# Patient Record
Sex: MALE | Race: WHITE | NOT HISPANIC OR LATINO | Employment: OTHER | URBAN - METROPOLITAN AREA
[De-identification: names, ages, dates, MRNs, and addresses within clinical notes are randomized per-mention and may not be internally consistent; named-entity substitution may affect disease eponyms.]

---

## 2017-08-11 RX ORDER — FLUTICASONE PROPIONATE 50 MCG
2 SPRAY, SUSPENSION (ML) NASAL DAILY
COMMUNITY

## 2017-08-11 RX ORDER — GAUZE BANDAGE 4" X 4"
BANDAGE TOPICAL
COMMUNITY

## 2017-08-11 RX ORDER — AMLODIPINE BESYLATE 5 MG/1
5 TABLET ORAL DAILY
COMMUNITY

## 2017-08-11 RX ORDER — SPIRONOLACTONE 25 MG/1
25 TABLET ORAL DAILY
COMMUNITY

## 2017-08-11 RX ORDER — FINASTERIDE 5 MG/1
5 TABLET, FILM COATED ORAL DAILY
COMMUNITY

## 2017-08-11 RX ORDER — ATENOLOL 25 MG/1
25 TABLET ORAL DAILY
COMMUNITY

## 2017-08-11 RX ORDER — SIMVASTATIN 20 MG
20 TABLET ORAL NIGHTLY
COMMUNITY

## 2017-08-11 RX ORDER — DOXAZOSIN 8 MG/1
8 TABLET ORAL NIGHTLY
COMMUNITY

## 2017-08-11 RX ORDER — BENAZEPRIL HYDROCHLORIDE 10 MG/1
10 TABLET ORAL DAILY
COMMUNITY
End: 2021-04-02

## 2017-08-14 ENCOUNTER — OFFICE VISIT (OUTPATIENT)
Dept: ORTHOPEDIC SURGERY | Facility: CLINIC | Age: 77
End: 2017-08-14

## 2017-08-14 VITALS
SYSTOLIC BLOOD PRESSURE: 115 MMHG | WEIGHT: 216 LBS | HEIGHT: 71 IN | HEART RATE: 51 BPM | DIASTOLIC BLOOD PRESSURE: 64 MMHG | BODY MASS INDEX: 30.24 KG/M2

## 2017-08-14 DIAGNOSIS — R52 PAIN: Primary | ICD-10-CM

## 2017-08-14 DIAGNOSIS — M19.079 ARTHRITIS OF FOOT: ICD-10-CM

## 2017-08-14 PROCEDURE — 20605 DRAIN/INJ JOINT/BURSA W/O US: CPT | Performed by: ORTHOPAEDIC SURGERY

## 2017-08-14 RX ORDER — OXYBUTYNIN CHLORIDE 10 MG/1
10 TABLET, EXTENDED RELEASE ORAL DAILY
COMMUNITY

## 2017-08-14 RX ORDER — METHYLPREDNISOLONE ACETATE 40 MG/ML
80 INJECTION, SUSPENSION INTRA-ARTICULAR; INTRALESIONAL; INTRAMUSCULAR; SOFT TISSUE
Status: COMPLETED | OUTPATIENT
Start: 2017-08-14 | End: 2017-08-14

## 2017-08-14 RX ORDER — BUPIVACAINE HYDROCHLORIDE 2.5 MG/ML
2 INJECTION, SOLUTION INFILTRATION; PERINEURAL
Status: COMPLETED | OUTPATIENT
Start: 2017-08-14 | End: 2017-08-14

## 2017-08-14 RX ADMIN — BUPIVACAINE HYDROCHLORIDE 2 ML: 2.5 INJECTION, SOLUTION INFILTRATION; PERINEURAL at 11:01

## 2017-08-14 RX ADMIN — METHYLPREDNISOLONE ACETATE 80 MG: 40 INJECTION, SUSPENSION INTRA-ARTICULAR; INTRALESIONAL; INTRAMUSCULAR; SOFT TISSUE at 11:01

## 2017-08-14 NOTE — PROGRESS NOTES
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     • amLODIPine (NORVASC) 5 MG tablet Take 5 mg by mouth Daily.     • atenolol (TENORMIN) 25 MG tablet Take 25 mg by mouth Daily.     • B Complex Vitamins (VITAMIN B COMPLEX PO) Take  by mouth.     • benazepril (LOTENSIN) 10 MG tablet Take 10 mg by mouth Daily.     • Calcium Carbonate-Vit D-Min (CALCIUM-VITAMIN D-MINERALS PO) Take  by mouth.     • Coenzyme Q10 (COQ10 PO) Take  by mouth.     • doxazosin (CARDURA) 8 MG tablet Take 8 mg by mouth Every Night.     • finasteride (PROSCAR) 5 MG tablet Take 5 mg by mouth Daily.     • Flaxseed, Linseed, (FLAX SEED OIL PO) Take 1,000 mg by mouth.     • fluticasone (FLONASE) 50 MCG/ACT nasal spray 2 sprays into each nostril Daily.     • Omega-3 Fatty Acids (FISH OIL) 435 MG capsule Take  by mouth.     • rivaroxaban (XARELTO) 15 MG tablet Take 15 mg by mouth Daily.     • simvastatin (ZOCOR) 20 MG tablet Take 20 mg by mouth Every Night.     • spironolactone (ALDACTONE) 25 MG tablet Take 25 mg by mouth Daily.       No current facility-administered medications on file prior to visit.       Allergies   Allergen Reactions   • Nickel Dermatitis   • Shellfish-Derived Products Rash   • Sulfa Antibiotics Rash      Past Medical History:   Diagnosis Date   • Cancer    • Chronic renal failure syndrome    • Heart disease    • History of kidney problems    • Hypertension    • Neurological disorder    • Sleep apnea    • Stroke      Past Surgical  "History:   Procedure Laterality Date   • TONSILLECTOMY     • VASECTOMY       Family History   Problem Relation Age of Onset   • Hypertension Mother    • Cancer Father    • Hypertension Other       Social History     Social History   • Marital status:      Spouse name: N/A   • Number of children: N/A   • Years of education: N/A     Occupational History   • Not on file.     Social History Main Topics   • Smoking status: Never Smoker   • Smokeless tobacco: Never Used   • Alcohol use Yes      Comment: Occ   • Drug use: No   • Sexual activity: Defer     Other Topics Concern   • Not on file     Social History Narrative        Review of Systems   Constitutional: Negative.    HENT: Negative.    Eyes: Negative.    Respiratory: Negative.    Cardiovascular: Negative.    Gastrointestinal: Negative.    Endocrine: Negative.    Genitourinary: Negative.    Musculoskeletal: Positive for arthralgias.   Skin: Negative.    Allergic/Immunologic: Negative.    Neurological: Negative.    Hematological: Negative.    Psychiatric/Behavioral: Negative.        The following portions of the patient's history were reviewed and updated as appropriate: allergies, current medications, past family history, past medical history, past social history, past surgical history and problem list.    Physical Exam:   /64  Pulse 51  Ht 71\" (180.3 cm)  Wt 216 lb (98 kg)  BMI 30.13 kg/m2  GENERAL: Body habitus: obese    Lower extremity edema: Left: 1+ pitting; Right: 1+ pitting    Gait: using cane     Mental Status:  awake and alert; oriented to person, place, and time  MSK:  No change in the tender, stiff in valgus left hindfoot, no change in the hammertoes  Medical Decision Making    Data Review:   none    Assessment/Plan/Diagnosis/Treatment Options:   Painful left hindfoot.  I injected the subtalar joint again.  We gave him some banana pads..  After discussing the risks (including infection, skin atrophy, etc), time out was called,  the left " subtalar joint was prepped and using sterile technique injected with 1cc of 0.5% marcaine and 2cc (80mg) DepoMedrol.  A band aid was applied.  No complications.

## 2017-08-14 NOTE — PROGRESS NOTES
Procedure   Small Joint Arthrocentesis  Consent given by: patient  Site marked: site marked  Timeout: Immediately prior to procedure a time out was called to verify the correct patient, procedure, equipment, support staff and site/side marked as required   Supporting Documentation  Indications: pain   Procedure Details  Location: foot - L subtalar  Needle size: 22 G  Approach: lateral  Medications administered: 2 mL bupivacaine; 80 mg methylPREDNISolone acetate 40 MG/ML  Patient tolerance: patient tolerated the procedure well with no immediate complications

## 2018-02-14 ENCOUNTER — OFFICE VISIT (OUTPATIENT)
Dept: ORTHOPEDIC SURGERY | Facility: CLINIC | Age: 78
End: 2018-02-14

## 2018-02-14 DIAGNOSIS — M19.079 ARTHRITIS OF FOOT: ICD-10-CM

## 2018-02-14 DIAGNOSIS — M19.072 PRIMARY OSTEOARTHRITIS OF LEFT ANKLE: Primary | ICD-10-CM

## 2018-02-14 PROCEDURE — 20605 DRAIN/INJ JOINT/BURSA W/O US: CPT | Performed by: ORTHOPAEDIC SURGERY

## 2018-02-14 RX ORDER — METHYLPREDNISOLONE ACETATE 40 MG/ML
80 INJECTION, SUSPENSION INTRA-ARTICULAR; INTRALESIONAL; INTRAMUSCULAR; SOFT TISSUE
Status: COMPLETED | OUTPATIENT
Start: 2018-02-14 | End: 2018-02-14

## 2018-02-14 RX ADMIN — METHYLPREDNISOLONE ACETATE 80 MG: 40 INJECTION, SUSPENSION INTRA-ARTICULAR; INTRALESIONAL; INTRAMUSCULAR; SOFT TISSUE at 11:33

## 2018-02-14 NOTE — PROGRESS NOTES
Procedure   Medium Joint Arthrocentesis  Date/Time: 2/14/2018 11:33 AM  Consent given by: patient  Site marked: site marked  Timeout: Immediately prior to procedure a time out was called to verify the correct patient, procedure, equipment, support staff and site/side marked as required   Supporting Documentation  Indications: pain   Procedure Details  Location: ankle - L ankle (subtalar )  Needle size: 22 G  Approach: anterolateral  Medications administered: 80 mg methylPREDNISolone acetate 40 MG/ML (2cc Bupivacaine .25%  NDC [id number]   [id number]  10/1/19)  Patient tolerance: patient tolerated the procedure well with no immediate complications

## 2018-02-14 NOTE — PROGRESS NOTES
ESTABLISHED PATIENT    Patient: Marcelino Elam  : 1940    Primary Care Provider: Giuliana Maxwell MD    Requesting Provider: As above    Follow-up (6 month f/u Left hindfoot arthritis)      History    Chief Complaint: Left hindfoot arthritis   History of Present Illness: He is here for his left hindfoot pain.  He still using his banana pads for the hammertoes.  He would like another injection for the left hindfoot arthritis.    Current Outpatient Prescriptions on File Prior to Visit   Medication Sig Dispense Refill   • ACETYLCARNITINE PO Take  by mouth.     • amLODIPine (NORVASC) 5 MG tablet Take 5 mg by mouth Daily.     • atenolol (TENORMIN) 25 MG tablet Take 25 mg by mouth Daily.     • B Complex Vitamins (VITAMIN B COMPLEX PO) Take  by mouth.     • benazepril (LOTENSIN) 10 MG tablet Take 10 mg by mouth Daily.     • Calcium Carbonate-Vit D-Min (CALCIUM-VITAMIN D-MINERALS PO) Take  by mouth.     • Coenzyme Q10 (COQ10 PO) Take  by mouth.     • doxazosin (CARDURA) 8 MG tablet Take 8 mg by mouth Every Night.     • finasteride (PROSCAR) 5 MG tablet Take 5 mg by mouth Daily.     • Flaxseed, Linseed, (FLAX SEED OIL PO) Take 1,000 mg by mouth.     • fluticasone (FLONASE) 50 MCG/ACT nasal spray 2 sprays into each nostril Daily.     • Omega-3 Fatty Acids (FISH OIL) 435 MG capsule Take  by mouth.     • oxybutynin XL (DITROPAN-XL) 10 MG 24 hr tablet Take 10 mg by mouth Daily.     • rivaroxaban (XARELTO) 15 MG tablet Take 15 mg by mouth Daily.     • simvastatin (ZOCOR) 20 MG tablet Take 20 mg by mouth Every Night.     • spironolactone (ALDACTONE) 25 MG tablet Take 25 mg by mouth Daily.       No current facility-administered medications on file prior to visit.       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     Social History Main Topics   • Smoking status: Never Smoker   • Smokeless tobacco: Never Used   • Alcohol use Yes      Comment: Occ   • Drug use: No   • Sexual activity: Defer     Other Topics Concern   • Not on file     Social History Narrative        Review of Systems    The following portions of the patient's history were reviewed and updated as appropriate: allergies, current medications, past family history, past medical history, past social history, past surgical history and problem list.    Physical Exam:   There were no vitals taken for this visit.  GENERAL: Body habitus: trunkal obesity    Lower extremity edema: Left: 1+ pitting; Right: 1+ pitting    Gait: using cane     Mental Status:  awake and alert; oriented to person, place, and time  MSK:  Left hindfoot is tender, stiff in valgus, no change in the hammertoes    Medical Decision Making    Data Review:   none    Assessment/Plan/Diagnosis/Treatment Options:   1. . Arthritis of foot  He still doing reasonably well with the injection every 6 months.    After discussing the risks (including infection, skin atrophy, etc), time out was called,  the left subtalar joint was prepped and using sterile  technique injected with 1cc of 0.5% marcaine and 2cc (80mg) DepoMedrol.  A band aid was applied.  No complications.

## 2018-08-15 ENCOUNTER — OFFICE VISIT (OUTPATIENT)
Dept: ORTHOPEDIC SURGERY | Facility: CLINIC | Age: 78
End: 2018-08-15

## 2018-08-15 VITALS — WEIGHT: 210.1 LBS | BODY MASS INDEX: 29.41 KG/M2 | OXYGEN SATURATION: 98 % | HEIGHT: 71 IN | HEART RATE: 103 BPM

## 2018-08-15 DIAGNOSIS — M19.079 ARTHRITIS OF FOOT: Primary | ICD-10-CM

## 2018-08-15 PROCEDURE — 20605 DRAIN/INJ JOINT/BURSA W/O US: CPT | Performed by: ORTHOPAEDIC SURGERY

## 2018-08-15 RX ORDER — BUPIVACAINE HYDROCHLORIDE 2.5 MG/ML
2 INJECTION, SOLUTION INFILTRATION; PERINEURAL
Status: COMPLETED | OUTPATIENT
Start: 2018-08-15 | End: 2018-08-15

## 2018-08-15 RX ORDER — METHYLPREDNISOLONE ACETATE 40 MG/ML
80 INJECTION, SUSPENSION INTRA-ARTICULAR; INTRALESIONAL; INTRAMUSCULAR; SOFT TISSUE
Status: COMPLETED | OUTPATIENT
Start: 2018-08-15 | End: 2018-08-15

## 2018-08-15 RX ADMIN — METHYLPREDNISOLONE ACETATE 80 MG: 40 INJECTION, SUSPENSION INTRA-ARTICULAR; INTRALESIONAL; INTRAMUSCULAR; SOFT TISSUE at 12:08

## 2018-08-15 RX ADMIN — BUPIVACAINE HYDROCHLORIDE 2 ML: 2.5 INJECTION, SOLUTION INFILTRATION; PERINEURAL at 12:08

## 2018-08-15 NOTE — PROGRESS NOTES
Procedure   Medium Joint Arthrocentesis  Date/Time: 8/15/2018 12:08 PM  Consent given by: patient  Site marked: site marked  Timeout: Immediately prior to procedure a time out was called to verify the correct patient, procedure, equipment, support staff and site/side marked as required   Supporting Documentation  Indications: pain   Procedure Details  Location: ankle (Left Subtalar joint) - L ankle  Needle size: 22 G  Approach: anterolateral  Medications administered: 2 mL bupivacaine 0.25 %; 80 mg methylPREDNISolone acetate 40 MG/ML  Patient tolerance: patient tolerated the procedure well with no immediate complications

## 2019-02-13 ENCOUNTER — OFFICE VISIT (OUTPATIENT)
Dept: ORTHOPEDIC SURGERY | Facility: CLINIC | Age: 79
End: 2019-02-13

## 2019-02-13 VITALS — HEART RATE: 77 BPM | BODY MASS INDEX: 29.41 KG/M2 | WEIGHT: 210.1 LBS | HEIGHT: 71 IN | OXYGEN SATURATION: 93 %

## 2019-02-13 DIAGNOSIS — M19.072 PRIMARY OSTEOARTHRITIS OF LEFT ANKLE: Primary | ICD-10-CM

## 2019-02-13 DIAGNOSIS — M24.575 CONTRACTURE OF JOINT OF FOOT, LEFT: ICD-10-CM

## 2019-02-13 PROCEDURE — 99212 OFFICE O/P EST SF 10 MIN: CPT | Performed by: ORTHOPAEDIC SURGERY

## 2019-02-13 PROCEDURE — 20605 DRAIN/INJ JOINT/BURSA W/O US: CPT | Performed by: ORTHOPAEDIC SURGERY

## 2019-02-13 RX ORDER — METHYLPREDNISOLONE ACETATE 40 MG/ML
80 INJECTION, SUSPENSION INTRA-ARTICULAR; INTRALESIONAL; INTRAMUSCULAR; SOFT TISSUE
Status: COMPLETED | OUTPATIENT
Start: 2019-02-13 | End: 2019-02-13

## 2019-02-13 RX ORDER — BUPIVACAINE HYDROCHLORIDE 2.5 MG/ML
2 INJECTION, SOLUTION INFILTRATION; PERINEURAL
Status: COMPLETED | OUTPATIENT
Start: 2019-02-13 | End: 2019-02-13

## 2019-02-13 RX ADMIN — METHYLPREDNISOLONE ACETATE 80 MG: 40 INJECTION, SUSPENSION INTRA-ARTICULAR; INTRALESIONAL; INTRAMUSCULAR; SOFT TISSUE at 11:14

## 2019-02-13 RX ADMIN — BUPIVACAINE HYDROCHLORIDE 2 ML: 2.5 INJECTION, SOLUTION INFILTRATION; PERINEURAL at 11:14

## 2019-02-13 NOTE — PROGRESS NOTES
Procedure   Small Joint Arthrocentesis: L subtalar  Consent given by: patient  Site marked: site marked  Timeout: Immediately prior to procedure a time out was called to verify the correct patient, procedure, equipment, support staff and site/side marked as required   Supporting Documentation  Indications: pain   Procedure Details  Location: foot - L subtalar  Preparation: Patient was prepped and draped in the usual sterile fashion  Needle size: 22 G  Approach: lateral  Medications administered: 2 mL bupivacaine 0.25 %; 80 mg methylPREDNISolone acetate 40 MG/ML  Patient tolerance: patient tolerated the procedure well with no immediate complications

## 2019-02-13 NOTE — PROGRESS NOTES
ESTABLISHED PATIENT    Patient: Marcelino Elam  : 1940    Primary Care Provider: Giuliana Maxwell MD    Requesting Provider: As above    Follow-up (6 month- Primary osteoarthritis of left ankle and foot)      History    Chief Complaint: left hindfoot pain, hammertoe    History of Present Illness: he returns for f/u.  No recent travels.  He is using his walking stick all the time now.  Still using banana pad for hammertoe.    Current Outpatient Medications on File Prior to Visit   Medication Sig Dispense Refill   • ACETYLCARNITINE PO Take  by mouth.     • amLODIPine (NORVASC) 5 MG tablet Take 5 mg by mouth Daily.     • atenolol (TENORMIN) 25 MG tablet Take 25 mg by mouth Daily.     • B Complex Vitamins (VITAMIN B COMPLEX PO) Take  by mouth.     • benazepril (LOTENSIN) 10 MG tablet Take 10 mg by mouth Daily.     • Calcium Carbonate-Vit D-Min (CALCIUM-VITAMIN D-MINERALS PO) Take  by mouth.     • Coenzyme Q10 (COQ10 PO) Take  by mouth.     • doxazosin (CARDURA) 8 MG tablet Take 8 mg by mouth Every Night.     • finasteride (PROSCAR) 5 MG tablet Take 5 mg by mouth Daily.     • Flaxseed, Linseed, (FLAX SEED OIL PO) Take 1,000 mg by mouth.     • fluticasone (FLONASE) 50 MCG/ACT nasal spray 2 sprays into each nostril Daily.     • Nitrofurantoin Macrocrystal (MACRODANTIN PO) Take  by mouth Daily.     • Omega-3 Fatty Acids (FISH OIL) 435 MG capsule Take  by mouth.     • oxybutynin XL (DITROPAN-XL) 10 MG 24 hr tablet Take 10 mg by mouth Daily.     • rivaroxaban (XARELTO) 15 MG tablet Take 15 mg by mouth Daily.     • simvastatin (ZOCOR) 20 MG tablet Take 20 mg by mouth Every Night.     • spironolactone (ALDACTONE) 25 MG tablet Take 25 mg by mouth Daily.       No current facility-administered medications on file prior to visit.       Allergies   Allergen Reactions   • Nickel Dermatitis   • Shellfish-Derived Products Rash   • Sulfa Antibiotics Rash      Past Medical History:   Diagnosis Date   • Cancer (CMS/HCC)     • Chronic renal failure syndrome    • Eye vision summary    • Heart disease    • History of kidney problems    • Hypertension    • Kidney problems    • Kidney stone    • Neurological disorder    • Neuropathy    • Osteoarthrosis, ankle and foot     hindfoot arthritits of the left, due to old posterior tibial dysfuntion   • Radiotherapy    • Sleep apnea    • Stroke (CMS/HCC)      Past Surgical History:   Procedure Laterality Date   • OTHER SURGICAL HISTORY     • TONSILLECTOMY     • VASECTOMY       Family History   Problem Relation Age of Onset   • Hypertension Mother    • Heart disease Mother    • Cancer Father    • Hypertension Other    • Heart disease Other       Social History     Socioeconomic History   • Marital status:      Spouse name: Not on file   • Number of children: Not on file   • Years of education: Not on file   • Highest education level: Not on file   Social Needs   • Financial resource strain: Not on file   • Food insecurity - worry: Not on file   • Food insecurity - inability: Not on file   • Transportation needs - medical: Not on file   • Transportation needs - non-medical: Not on file   Occupational History   • Not on file   Tobacco Use   • Smoking status: Never Smoker   • Smokeless tobacco: Never Used   Substance and Sexual Activity   • Alcohol use: Yes     Comment: Occ   • Drug use: No   • Sexual activity: Defer   Other Topics Concern   • Not on file   Social History Narrative   • Not on file        Review of Systems   Constitutional: Negative.    HENT: Negative.    Eyes: Negative.    Respiratory: Negative.    Cardiovascular: Negative.    Gastrointestinal: Negative.    Endocrine: Negative.    Genitourinary: Negative.    Musculoskeletal: Positive for arthralgias.   Skin: Negative.    Allergic/Immunologic: Negative.    Neurological: Negative.    Hematological: Negative.    Psychiatric/Behavioral: Negative.        The following portions of the patient's history were reviewed and updated as  "appropriate: allergies, current medications, past family history, past medical history, past social history, past surgical history and problem list.    Physical Exam:   Pulse 77   Ht 180.3 cm (70.98\")   Wt 95.3 kg (210 lb 1.6 oz)   SpO2 93%   BMI 29.32 kg/m²   GENERAL: Body habitus: trunkal obesity    Lower extremity edema: Left: none; Right: none    Gait: using walking stick     Mental Status:  awake and alert; oriented to person, place, and time  MSK:           Foot: Left:  hammertoes not tender, no pre-ulcerous lesions, no change in stiff valgus hindfoot, tender over subtalar joint        Medical Decision Making    Data Review:   ordered and reviewed x-rays today    Assessment/Plan/Diagnosis/Treatment Options:   1. Primary osteoarthritis of left ankle  No xray changes - hindfoot valgus and subtalar arthritis.  He would like another injection.  He is doing reasonably well with these.      After discussing the risks (including infection, skin atrophy, etc), time out was called,  the left subtalar joint was prepped and using sterile technique injected with 2cc of 0.5% marcaine and 2cc (80mg) DepoMedrol.  A band aid was applied.  No complications.       I will see him in 6 months, no xray needed at that time        2. Contracture of joint of foot, left  Still reasonably comfortable with banana pads                            "

## 2019-08-19 ENCOUNTER — OFFICE VISIT (OUTPATIENT)
Dept: ORTHOPEDIC SURGERY | Facility: CLINIC | Age: 79
End: 2019-08-19

## 2019-08-19 VITALS
BODY MASS INDEX: 29.4 KG/M2 | RESPIRATION RATE: 16 BRPM | HEIGHT: 71 IN | OXYGEN SATURATION: 96 % | WEIGHT: 210 LBS | HEART RATE: 95 BPM

## 2019-08-19 DIAGNOSIS — L98.491 NEUROPATHIC ULCER, LIMITED TO BREAKDOWN OF SKIN (HCC): ICD-10-CM

## 2019-08-19 DIAGNOSIS — M19.072 PRIMARY OSTEOARTHRITIS OF LEFT ANKLE: Primary | ICD-10-CM

## 2019-08-19 PROCEDURE — 20605 DRAIN/INJ JOINT/BURSA W/O US: CPT | Performed by: ORTHOPAEDIC SURGERY

## 2019-08-19 PROCEDURE — 99212 OFFICE O/P EST SF 10 MIN: CPT | Performed by: ORTHOPAEDIC SURGERY

## 2019-08-19 RX ORDER — LIDOCAINE HYDROCHLORIDE 10 MG/ML
2 INJECTION, SOLUTION EPIDURAL; INFILTRATION; INTRACAUDAL; PERINEURAL
Status: COMPLETED | OUTPATIENT
Start: 2019-08-19 | End: 2019-08-19

## 2019-08-19 RX ORDER — METHYLPREDNISOLONE ACETATE 40 MG/ML
80 INJECTION, SUSPENSION INTRA-ARTICULAR; INTRALESIONAL; INTRAMUSCULAR; SOFT TISSUE
Status: COMPLETED | OUTPATIENT
Start: 2019-08-19 | End: 2019-08-19

## 2019-08-19 RX ORDER — CEPHALEXIN 500 MG/1
500 CAPSULE ORAL EVERY 6 HOURS
Qty: 60 CAPSULE | Refills: 0 | Status: SHIPPED | OUTPATIENT
Start: 2019-08-19 | End: 2021-11-08

## 2019-08-19 RX ADMIN — LIDOCAINE HYDROCHLORIDE 2 ML: 10 INJECTION, SOLUTION EPIDURAL; INFILTRATION; INTRACAUDAL; PERINEURAL at 11:15

## 2019-08-19 RX ADMIN — METHYLPREDNISOLONE ACETATE 80 MG: 40 INJECTION, SUSPENSION INTRA-ARTICULAR; INTRALESIONAL; INTRAMUSCULAR; SOFT TISSUE at 11:15

## 2019-08-19 NOTE — PROGRESS NOTES
Procedure   Medium Joint Arthrocentesis  Date/Time: 8/19/2019 11:15 AM  Consent given by: patient  Site marked: site marked  Timeout: Immediately prior to procedure a time out was called to verify the correct patient, procedure, equipment, support staff and site/side marked as required   Supporting Documentation  Indications: pain   Procedure Details  Location: subtalar.  Preparation: Patient was prepped and draped in the usual sterile fashion  Needle size: 22 G  Approach: lateral.  Medications administered: 2 mL lidocaine PF 1% 1 %; 80 mg methylPREDNISolone acetate 40 MG/ML

## 2019-08-19 NOTE — PROGRESS NOTES
ESTABLISHED PATIENT    Patient: Marcelino Elam  : 1940    Primary Care Provider: Giuliana Maxwell MD    Requesting Provider: As above    Follow-up of the Left Foot and Pain of the Right Foot      History    Chief Complaint: left foot pain, right foot neuropathic ulcer    History of Present Illness: He returns for follow-up of his left hindfoot arthritis.  He was able to go to an island in the [place], he did a lot of walking.  He now has a callus and an ulcer on the tip of the right great toe.  Over the past week he has noted some redness around the distal phalanx.  He would like to follow-up for this with his podiatrist, the podiatrist has been treating the toe, but he was wondering if he needed an antibiotic.    Current Outpatient Medications on File Prior to Visit   Medication Sig Dispense Refill   • ACETYLCARNITINE PO Take  by mouth.     • amLODIPine (NORVASC) 5 MG tablet Take 5 mg by mouth Daily.     • atenolol (TENORMIN) 25 MG tablet Take 25 mg by mouth Daily.     • B Complex Vitamins (VITAMIN B COMPLEX PO) Take  by mouth.     • benazepril (LOTENSIN) 10 MG tablet Take 10 mg by mouth Daily.     • Calcium Carbonate-Vit D-Min (CALCIUM-VITAMIN D-MINERALS PO) Take  by mouth.     • Coenzyme Q10 (COQ10 PO) Take  by mouth.     • doxazosin (CARDURA) 8 MG tablet Take 8 mg by mouth Every Night.     • finasteride (PROSCAR) 5 MG tablet Take 5 mg by mouth Daily.     • Flaxseed, Linseed, (FLAX SEED OIL PO) Take 1,000 mg by mouth.     • fluticasone (FLONASE) 50 MCG/ACT nasal spray 2 sprays into each nostril Daily.     • Nitrofurantoin Macrocrystal (MACRODANTIN PO) Take  by mouth Daily.     • Omega-3 Fatty Acids (FISH OIL) 435 MG capsule Take  by mouth.     • oxybutynin XL (DITROPAN-XL) 10 MG 24 hr tablet Take 10 mg by mouth Daily.     • rivaroxaban (XARELTO) 15 MG tablet Take 15 mg by mouth Daily.     • simvastatin (ZOCOR) 20 MG tablet Take 20 mg by mouth Every Night.     • spironolactone (ALDACTONE) 25 MG  tablet Take 25 mg by mouth Daily.       No current facility-administered medications on file prior to visit.       Allergies   Allergen Reactions   • Nickel Dermatitis   • Shellfish-Derived Products Rash   • Sulfa Antibiotics Rash      Past Medical History:   Diagnosis Date   • Cancer (CMS/HCC)    • Chronic renal failure syndrome    • Eye vision summary    • Heart disease    • History of kidney problems    • Hypertension    • Kidney problems    • Kidney stone    • Neurological disorder    • Neuropathy    • Osteoarthrosis, ankle and foot     hindfoot arthritits of the left, due to old posterior tibial dysfuntion   • Radiotherapy    • Sleep apnea    • Stroke (CMS/HCC)      Past Surgical History:   Procedure Laterality Date   • OTHER SURGICAL HISTORY     • TONSILLECTOMY     • VASECTOMY       Family History   Problem Relation Age of Onset   • Hypertension Mother    • Heart disease Mother    • Cancer Father    • Hypertension Other    • Heart disease Other       Social History     Socioeconomic History   • Marital status:      Spouse name: Not on file   • Number of children: Not on file   • Years of education: Not on file   • Highest education level: Not on file   Tobacco Use   • Smoking status: Never Smoker   • Smokeless tobacco: Never Used   Substance and Sexual Activity   • Alcohol use: Yes     Comment: Occ   • Drug use: No   • Sexual activity: Defer        Review of Systems   Constitutional: Negative.    HENT: Negative.    Eyes: Negative.    Respiratory: Negative.    Cardiovascular: Negative.    Gastrointestinal: Negative.    Endocrine: Negative.    Genitourinary: Negative.    Musculoskeletal: Positive for arthralgias and joint swelling.   Skin: Negative.    Allergic/Immunologic: Negative.    Neurological: Negative.    Hematological: Negative.    Psychiatric/Behavioral: Negative.        The following portions of the patient's history were reviewed and updated as appropriate: allergies, current medications, past  "family history, past medical history, past social history, past surgical history and problem list.    Physical Exam:   Pulse 95   Resp 16   Ht 180.3 cm (70.99\")   Wt 95.3 kg (210 lb)   SpO2 96%   BMI 29.30 kg/m²   GENERAL: Body habitus: overweight    Lower extremity edema: Left: trace; Right: trace    Gait: Using walking stick     Mental Status:  awake and alert; oriented to person, place, and time  MSK:  Tibia:  Right:  non tender; Left:  non tender        Ankle:  Right: non tender; Left:  non tender        Foot:  Right:  Callus on tip of great toe with underlying small ulcer, mild erythema around the dorsal distal phalanx, no drainage, no proximal erythema; Left:  Tender over the subtalar joint    NEURO Sensation:  diminished    Medical Decision Making    Data Review:   none    Assessment/Plan/Diagnosis/Treatment Options:   1. Primary osteoarthritis of left ankle  He still has reasonable benefit from intermittent subtalar injection.  I will see him in 6 months standing 2 views of both feet and AP both ankles    After discussing the risks (including infection, skin atrophy, etc), time out was called,  the left subtalar joint was prepped and using sterile technique injected with 2cc of 1% lidocaine and 2cc (80mg) DepoMedrol.  A band aid was applied.  No complications.             2. Neuropathic ulcer, limited to breakdown of skin (CMS/HCC)  The ulcer on the right great toe is concerning because of the local erythema.  He wants to follow-up with his podiatrist and I think that is fine.  But we will go ahead and start Keflex today.  I sent it to his pharmacy.  I will be happy to see him for the ulcer if it does not improve.  Otherwise I will see him in 6 months as noted above                            "

## 2020-02-12 ENCOUNTER — OFFICE VISIT (OUTPATIENT)
Dept: ORTHOPEDIC SURGERY | Facility: CLINIC | Age: 80
End: 2020-02-12

## 2020-02-12 VITALS — BODY MASS INDEX: 28.7 KG/M2 | HEIGHT: 71 IN | WEIGHT: 205 LBS | OXYGEN SATURATION: 92 % | HEART RATE: 57 BPM

## 2020-02-12 DIAGNOSIS — M19.079 ARTHRITIS OF FOOT: Primary | ICD-10-CM

## 2020-02-12 PROCEDURE — 99212 OFFICE O/P EST SF 10 MIN: CPT | Performed by: ORTHOPAEDIC SURGERY

## 2020-02-12 PROCEDURE — 20605 DRAIN/INJ JOINT/BURSA W/O US: CPT | Performed by: ORTHOPAEDIC SURGERY

## 2020-02-12 RX ORDER — METHYLPREDNISOLONE ACETATE 40 MG/ML
80 INJECTION, SUSPENSION INTRA-ARTICULAR; INTRALESIONAL; INTRAMUSCULAR; SOFT TISSUE
Status: COMPLETED | OUTPATIENT
Start: 2020-02-12 | End: 2020-02-12

## 2020-02-12 RX ORDER — LIDOCAINE HYDROCHLORIDE 10 MG/ML
2 INJECTION, SOLUTION EPIDURAL; INFILTRATION; INTRACAUDAL; PERINEURAL
Status: COMPLETED | OUTPATIENT
Start: 2020-02-12 | End: 2020-02-12

## 2020-02-12 RX ADMIN — METHYLPREDNISOLONE ACETATE 80 MG: 40 INJECTION, SUSPENSION INTRA-ARTICULAR; INTRALESIONAL; INTRAMUSCULAR; SOFT TISSUE at 10:45

## 2020-02-12 RX ADMIN — LIDOCAINE HYDROCHLORIDE 2 ML: 10 INJECTION, SOLUTION EPIDURAL; INFILTRATION; INTRACAUDAL; PERINEURAL at 10:45

## 2020-02-12 NOTE — PROGRESS NOTES
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  Dense neuropathy.  Since last visit he has been diagnosed with multiple myeloma, is going to have treatment at [facility].      Current Outpatient Medications on File Prior to Visit   Medication Sig Dispense Refill   • ACETYLCARNITINE PO Take  by mouth.     • amLODIPine (NORVASC) 5 MG tablet Take 5 mg by mouth Daily.     • atenolol (TENORMIN) 25 MG tablet Take 25 mg by mouth Daily.     • B Complex Vitamins (VITAMIN B COMPLEX PO) Take  by mouth.     • benazepril (LOTENSIN) 10 MG tablet Take 10 mg by mouth Daily.     • Calcium Carbonate-Vit D-Min (CALCIUM-VITAMIN D-MINERALS PO) Take  by mouth.     • cephalexin (KEFLEX) 500 MG capsule Take 1 capsule by mouth Every 6 (Six) Hours. 60 capsule 0   • Coenzyme Q10 (COQ10 PO) Take  by mouth.     • doxazosin (CARDURA) 8 MG tablet Take 8 mg by mouth Every Night.     • finasteride (PROSCAR) 5 MG tablet Take 5 mg by mouth Daily.     • Flaxseed, Linseed, (FLAX SEED OIL PO) Take 1,000 mg by mouth.     • fluticasone (FLONASE) 50 MCG/ACT nasal spray 2 sprays into each nostril Daily.     • Nitrofurantoin Macrocrystal (MACRODANTIN PO) Take  by mouth Daily.     • Omega-3 Fatty Acids (FISH OIL) 435 MG capsule Take  by mouth.     • oxybutynin XL (DITROPAN-XL) 10 MG 24 hr tablet Take 10 mg by mouth Daily.     • rivaroxaban (XARELTO) 15 MG tablet Take 15 mg by mouth Daily.     • simvastatin (ZOCOR) 20 MG tablet Take 20 mg by mouth Every Night.     • spironolactone (ALDACTONE) 25 MG tablet Take 25 mg by mouth Daily.       No current facility-administered medications on file prior to visit.       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   • Radiotherapy    • Sleep apnea    • Stroke (CMS/HCC)      Past Surgical History:   Procedure Laterality Date   • OTHER SURGICAL HISTORY     • TONSILLECTOMY     • VASECTOMY       Family History   Problem Relation Age of Onset   • Hypertension Mother    • Heart disease Mother    • Cancer Father    • Hypertension Other    • Heart disease Other       Social History     Socioeconomic History   • Marital status:      Spouse name: Not on file   • Number of children: Not on file   • Years of education: Not on file   • Highest education level: Not on file   Tobacco Use   • Smoking status: Never Smoker   • Smokeless tobacco: Never Used   Substance and Sexual Activity   • Alcohol use: Yes     Comment: Occ   • Drug use: No   • Sexual activity: Defer        Review of Systems   Musculoskeletal: Positive for arthralgias and joint swelling.       The following portions of the patient's history were reviewed and updated as appropriate: allergies, current medications, past family history, past medical history, past social history, past surgical history and problem list.    Physical Exam:   There were no vitals taken for this visit.  GENERAL: Body habitus: overweight    Lower extremity edema: Left: trace; Right: trace    Gait: antalgic and using walking stick     Mental Status:  awake and alert; oriented to person, place, and time  MSK:  Tibia:  Right:  non tender; Left:  non tender        Ankle:  Right: non tender; Left:  tender in sinus  tarsi, no change in hindfoot valgus        Foot:  Right:  no ulcer on great toe; Left:  tender in hindfoot    NEURO Sensation:  diminished    Medical Decision Making    Data Review:   ordered and reviewed x-rays today    Assessment/Plan/Diagnosis/Treatment Options:   1. Arthritis of foot  X-rays are stable, no tilt of talus on either side, no lytic lesions, no change.        After discussing the risks (including infection, skin atrophy, etc), time out was called,  the left subtalar joint was prepped and using sterile technique injected with 2cc of 1% lidocaine and 2cc (80mg) DepoMedrol.  A band aid was applied.  No complications.         I will see him in 6 months

## 2020-02-12 NOTE — PROGRESS NOTES
Procedure   Medium Joint Arthrocentesis: L ankle  Date/Time: 2/12/2020 10:45 AM  Consent given by: patient  Site marked: site marked  Timeout: Immediately prior to procedure a time out was called to verify the correct patient, procedure, equipment, support staff and site/side marked as required   Supporting Documentation  Indications: pain   Procedure Details  Location: ankle - L ankle (subtalar joint)  Preparation: Patient was prepped and draped in the usual sterile fashion  Needle size: 22 G  Approach: anterolateral  Medications administered: 80 mg methylPREDNISolone acetate 40 MG/ML; 2 mL lidocaine PF 1% 1 %  Patient tolerance: patient tolerated the procedure well with no immediate complications

## 2020-08-12 ENCOUNTER — OFFICE VISIT (OUTPATIENT)
Dept: ORTHOPEDIC SURGERY | Facility: CLINIC | Age: 80
End: 2020-08-12

## 2020-08-12 VITALS — BODY MASS INDEX: 26.07 KG/M2 | WEIGHT: 186.2 LBS | HEIGHT: 71 IN | HEART RATE: 62 BPM

## 2020-08-12 DIAGNOSIS — M19.079 ARTHRITIS OF FOOT: ICD-10-CM

## 2020-08-12 DIAGNOSIS — G89.29 CHRONIC PAIN OF LEFT ANKLE: Primary | ICD-10-CM

## 2020-08-12 DIAGNOSIS — M25.572 CHRONIC PAIN OF LEFT ANKLE: Primary | ICD-10-CM

## 2020-08-12 PROCEDURE — 20605 DRAIN/INJ JOINT/BURSA W/O US: CPT | Performed by: ORTHOPAEDIC SURGERY

## 2020-08-12 RX ORDER — CHLORAL HYDRATE 500 MG
CAPSULE ORAL
COMMUNITY

## 2020-08-12 RX ORDER — CHOLECALCIFEROL (VITAMIN D3) 1250 MCG
CAPSULE ORAL
COMMUNITY

## 2020-08-12 RX ORDER — KETOCONAZOLE 20 MG/ML
SHAMPOO TOPICAL
COMMUNITY
Start: 2014-12-16

## 2020-08-12 RX ORDER — LENALIDOMIDE 5 MG/1
5 CAPSULE ORAL ONCE
COMMUNITY

## 2020-08-12 RX ORDER — SILDENAFIL 100 MG/1
TABLET, FILM COATED ORAL
COMMUNITY

## 2020-08-12 RX ORDER — BENAZEPRIL HYDROCHLORIDE 10 MG/1
TABLET ORAL
COMMUNITY

## 2020-08-12 RX ORDER — TADALAFIL 5 MG/1
TABLET ORAL
COMMUNITY

## 2020-08-12 RX ORDER — METHYLPREDNISOLONE ACETATE 40 MG/ML
80 INJECTION, SUSPENSION INTRA-ARTICULAR; INTRALESIONAL; INTRAMUSCULAR; SOFT TISSUE
Status: COMPLETED | OUTPATIENT
Start: 2020-08-12 | End: 2020-08-12

## 2020-08-12 RX ORDER — LIDOCAINE HYDROCHLORIDE 10 MG/ML
2 INJECTION, SOLUTION EPIDURAL; INFILTRATION; INTRACAUDAL; PERINEURAL
Status: COMPLETED | OUTPATIENT
Start: 2020-08-12 | End: 2020-08-12

## 2020-08-12 RX ADMIN — METHYLPREDNISOLONE ACETATE 80 MG: 40 INJECTION, SUSPENSION INTRA-ARTICULAR; INTRALESIONAL; INTRAMUSCULAR; SOFT TISSUE at 11:04

## 2020-08-12 RX ADMIN — LIDOCAINE HYDROCHLORIDE 2 ML: 10 INJECTION, SOLUTION EPIDURAL; INFILTRATION; INTRACAUDAL; PERINEURAL at 11:04

## 2020-08-12 NOTE — PROGRESS NOTES
ESTABLISHED PATIENT    Patient: Marcelino Elam  : 1940    Primary Care Provider: Giuliana Maxwell MD    Requesting Provider: As above    Follow-up (6 month follow up Arthritis of foot)      History    Chief Complaint: Left hindfoot pain    History of Present Illness: He returns for follow-up of his severe left hindfoot arthritis.  He reports his treatment for multiple myeloma is going well.    Current Outpatient Medications on File Prior to Visit   Medication Sig Dispense Refill   • ACETYLCARNITINE PO Take  by mouth.     • amLODIPine (NORVASC) 5 MG tablet Take 5 mg by mouth Daily.     • atenolol (TENORMIN) 25 MG tablet Take 25 mg by mouth Daily.     • B Complex Vitamins (VITAMIN B COMPLEX PO) Take  by mouth.     • B Complex Vitamins (VITAMIN B-COMPLEX 100 IJ) Vitamin B-Complex   Daily     • benazepril (LOTENSIN) 10 MG tablet Take 10 mg by mouth Daily.     • benazepril (LOTENSIN) 10 MG tablet benazepril 10 mg tablet   TAKE ONE TABLET BY MOUTH DAILY     • Calcium Carbonate-Vit D-Min (CALCIUM-VITAMIN D-MINERALS PO) Take  by mouth.     • cephalexin (KEFLEX) 500 MG capsule Take 1 capsule by mouth Every 6 (Six) Hours. 60 capsule 0   • Cholecalciferol (VITAMIN D3) 1.25 MG (29992 UT) capsule Vitamin D3   Daily     • Coenzyme Q10 (COQ10 PO) Take  by mouth.     • Coenzyme Q10 10 MG capsule coenzyme Q10 100 mg capsule   Daily     • doxazosin (CARDURA) 8 MG tablet Take 8 mg by mouth Every Night.     • finasteride (PROSCAR) 5 MG tablet Take 5 mg by mouth Daily.     • Flaxseed, Linseed, (FLAX SEED OIL PO) Take 1,000 mg by mouth.     • fluticasone (FLONASE) 50 MCG/ACT nasal spray 2 sprays into each nostril Daily.     • ketoconazole (NIZORAL) 2 % shampoo ketoconazole 2 % shampoo   Daily     • lenalidomide (Revlimid) 5 MG capsule Take 5 mg by mouth 1 (One) Time.     • LEVOFLOXACIN IV Infuse  into a venous catheter.     • Nitrofurantoin Macrocrystal (MACRODANTIN PO) Take  by mouth Daily.     • Omega-3 Fatty Acids  (FISH OIL) 1000 MG capsule capsule Fish Oil 300 mg-1,000 mg capsule,delayed release   Daily     • Omega-3 Fatty Acids (FISH OIL) 435 MG capsule Take  by mouth.     • oxybutynin XL (DITROPAN-XL) 10 MG 24 hr tablet Take 10 mg by mouth Daily.     • rivaroxaban (XARELTO) 15 MG tablet Take 15 mg by mouth Daily.     • sildenafil (VIAGRA) 100 MG tablet sildenafil 100 mg tablet   TAKE ONE TABLET BY MOUTH DAILY     • simvastatin (ZOCOR) 20 MG tablet Take 20 mg by mouth Every Night.     • spironolactone (ALDACTONE) 25 MG tablet Take 25 mg by mouth Daily.     • tadalafil (CIALIS) 5 MG tablet tadalafil 5 mg tablet   Take 1 tablet daily PRN       No current facility-administered medications on file prior to visit.       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   • Radiotherapy    • Sleep apnea    • Stroke (CMS/HCC)      Past Surgical History:   Procedure Laterality Date   • OTHER SURGICAL HISTORY     • TONSILLECTOMY     • VASECTOMY       Family History   Problem Relation Age of Onset   • Hypertension Mother    • Heart disease Mother    • Cancer Father    • Hypertension Other    • Heart disease Other       Social History     Socioeconomic History   • Marital status:      Spouse name: Not on file   • Number of children: Not on file   • Years of education: Not on file   • Highest education level: Not on file   Tobacco Use   • Smoking status: Never Smoker   • Smokeless tobacco: Never Used   Substance and Sexual Activity   • Alcohol use: Yes  "    Comment: Occ   • Drug use: No   • Sexual activity: Defer        Review of Systems   Constitutional: Negative.    HENT: Negative.    Eyes: Negative.    Respiratory: Negative.    Cardiovascular: Negative.    Gastrointestinal: Negative.    Endocrine: Negative.    Genitourinary: Negative.    Musculoskeletal: Positive for arthralgias.   Skin: Negative.    Allergic/Immunologic: Negative.    Neurological: Negative.    Hematological: Negative.    Psychiatric/Behavioral: Negative.        The following portions of the patient's history were reviewed and updated as appropriate: allergies, current medications, past family history, past medical history, past social history, past surgical history and problem list.    Physical Exam:   Pulse 62   Ht 180.3 cm (70.98\")   Wt 84.5 kg (186 lb 3.2 oz)   BMI 25.98 kg/m²   GENERAL: Body habitus: overweight    Lower extremity edema: Left: trace; Right: trace    Gait: Using walking sticks     Mental Status:  awake and alert; oriented to person, place, and time  MSK:  Tibia:  Right:  non tender; Left:  non tender        Ankle:  Right: non tender; Left:  Tender in the subtalar joint in the sinus Tarsi, very stiff as previously    Of note he has a callus on the tip of the right great toe, his podiatrist is taking care of this, no signs of infection        NEURO Sensation:  diminished    Medical Decision Making    Data Review:   none    Assessment/Plan/Diagnosis/Treatment Options:   1. Chronic pain of left ankle  He is here for injection, in the left subtalar joint.  His podiatrist is taking care of the callus on the right great toe.  I will see him in 6 months or sooner with any problems.      After discussing the risks (including infection, skin atrophy, etc), time out was called,  the left subtalar joint was prepped and using sterile technique injected with 2cc of 1% lidocaine and 2cc (80mg) DepoMedrol.  A band aid was applied.  No complications.           - Medium Joint " Arthrocentesis: L ankle

## 2020-08-12 NOTE — PROGRESS NOTES
Procedure   Medium Joint Arthrocentesis: L ankle  Date/Time: 8/12/2020 11:04 AM  Consent given by: patient  Site marked: site marked  Timeout: Immediately prior to procedure a time out was called to verify the correct patient, procedure, equipment, support staff and site/side marked as required   Supporting Documentation  Indications: pain   Procedure Details  Location: ankle - L ankle  Preparation: Patient was prepped and draped in the usual sterile fashion  Needle size: 22 G  Approach: anteromedial  Medications administered: 80 mg methylPREDNISolone acetate 40 MG/ML; 2 mL lidocaine PF 1% 1 %  Patient tolerance: patient tolerated the procedure well with no immediate complications

## 2021-01-20 ENCOUNTER — TELEPHONE (OUTPATIENT)
Dept: ORTHOPEDIC SURGERY | Facility: CLINIC | Age: 81
End: 2021-01-20

## 2021-01-20 NOTE — TELEPHONE ENCOUNTER
Called patient to get their appointment on 2/17/2021 rescheduled. Dr. Lopez will be out of the office from 2/16/2021-3/16/2021.

## 2021-03-18 NOTE — PROGRESS NOTES
----- Message from Ethelyn Kanner, MA sent at 3/18/2021 11:11 AM EDT -----  Regarding: [facility]  03/18/21 11:12 AM    Hello, our patient Raymond Ortiz has had CRC: Colonoscopy, Hemoglobin A1c, Hepatitis C, and Urine Microalbumin completed/performed  Please assist in updating the patient chart by pulling the Care Everywhere (CE) document  The date of service is colonoscopy [id number], HepC [id number], A1C [id number], Microalbumin urine [id number]       Thank you,  Ethelyn Kanner, MA  [facility] ESTABLISHED PATIENT    Patient: Marcelino Elam  : 1940    Primary Care Provider: Giuliana Maxwell MD    Requesting Provider: As above    Follow-up (6 month - Primary osteoarthritis of left ankle)      History    Chief Complaint: left hindfoot pain    History of Present Illness: he is here for left hindfoot pain, would like another injection.  Using banana pad for hammertoe    Current Outpatient Prescriptions on File Prior to Visit   Medication Sig Dispense Refill   • ACETYLCARNITINE PO Take  by mouth.     • amLODIPine (NORVASC) 5 MG tablet Take 5 mg by mouth Daily.     • atenolol (TENORMIN) 25 MG tablet Take 25 mg by mouth Daily.     • B Complex Vitamins (VITAMIN B COMPLEX PO) Take  by mouth.     • benazepril (LOTENSIN) 10 MG tablet Take 10 mg by mouth Daily.     • Calcium Carbonate-Vit D-Min (CALCIUM-VITAMIN D-MINERALS PO) Take  by mouth.     • Coenzyme Q10 (COQ10 PO) Take  by mouth.     • doxazosin (CARDURA) 8 MG tablet Take 8 mg by mouth Every Night.     • finasteride (PROSCAR) 5 MG tablet Take 5 mg by mouth Daily.     • Flaxseed, Linseed, (FLAX SEED OIL PO) Take 1,000 mg by mouth.     • fluticasone (FLONASE) 50 MCG/ACT nasal spray 2 sprays into each nostril Daily.     • Omega-3 Fatty Acids (FISH OIL) 435 MG capsule Take  by mouth.     • oxybutynin XL (DITROPAN-XL) 10 MG 24 hr tablet Take 10 mg by mouth Daily.     • rivaroxaban (XARELTO) 15 MG tablet Take 15 mg by mouth Daily.     • simvastatin (ZOCOR) 20 MG tablet Take 20 mg by mouth Every Night.     • spironolactone (ALDACTONE) 25 MG tablet Take 25 mg by mouth Daily.       No current facility-administered medications on file prior to visit.       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     Social History Main Topics   • Smoking status: Never Smoker   • Smokeless tobacco: Never Used   • Alcohol use Yes      Comment: Occ   • Drug use: No   • Sexual activity: Defer     Other Topics Concern   • Not on file     Social History Narrative   • No narrative on file        Review of Systems   Constitutional: Negative.    HENT: Negative.    Eyes: Negative.    Respiratory: Negative.    Cardiovascular: Negative.    Gastrointestinal: Negative.    Endocrine: Negative.    Genitourinary: Negative.    Musculoskeletal: Positive for arthralgias, gait problem and joint swelling.   Skin: Negative.    Allergic/Immunologic: Negative.    Hematological: Negative.    Psychiatric/Behavioral: Negative.        The following portions of the patient's history were reviewed and updated as appropriate: allergies, current medications, past family history, past medical history, past social history, past surgical history and problem list.    Physical Exam:   Pulse 103   Ht 180.3 cm (70.98\")   Wt 95.3 kg (210 lb 1.6 oz)   SpO2 98%   BMI 29.32 kg/m²   GENERAL: Body habitus: overweight    Lower extremity edema: Left: trace; Right: trace    Gait: antalgic and using cane     Mental Status:  awake and alert; " oriented to person, place, and time  MSK:  Tender left subtalar joint    Medical Decision Making    Data Review:   none    Assessment/Plan/Diagnosis/Treatment Options:   1. Arthritis of foot  Still doing reasonably well with left supramalleolar brace and intermittent injection.      After discussing the risks (including infection, skin atrophy, etc), time out was called,  the left subtalar joint was prepped and using sterile technique injected with 1cc of 0.5% marcaine and 2cc (80mg) DepoMedrol.  A band aid was applied.  No complications.         I will see him in 6 months, sooner if pain worsens.  Standing 2 views left foot next visit

## 2021-04-02 ENCOUNTER — OFFICE VISIT (OUTPATIENT)
Dept: ORTHOPEDIC SURGERY | Facility: CLINIC | Age: 81
End: 2021-04-02

## 2021-04-02 VITALS
HEIGHT: 71 IN | WEIGHT: 185.6 LBS | BODY MASS INDEX: 25.98 KG/M2 | SYSTOLIC BLOOD PRESSURE: 142 MMHG | DIASTOLIC BLOOD PRESSURE: 80 MMHG

## 2021-04-02 DIAGNOSIS — M25.572 CHRONIC PAIN OF LEFT ANKLE: Primary | ICD-10-CM

## 2021-04-02 DIAGNOSIS — G89.29 CHRONIC PAIN OF LEFT ANKLE: Primary | ICD-10-CM

## 2021-04-02 PROCEDURE — 20605 DRAIN/INJ JOINT/BURSA W/O US: CPT | Performed by: ORTHOPAEDIC SURGERY

## 2021-04-02 RX ORDER — DOXAZOSIN MESYLATE 4 MG/1
TABLET ORAL
COMMUNITY
Start: 2021-03-27

## 2021-04-02 RX ORDER — LIDOCAINE HYDROCHLORIDE 10 MG/ML
2 INJECTION, SOLUTION INFILTRATION; PERINEURAL
Status: COMPLETED | OUTPATIENT
Start: 2021-04-02 | End: 2021-04-02

## 2021-04-02 RX ORDER — OXYBUTYNIN CHLORIDE 15 MG/1
TABLET, EXTENDED RELEASE ORAL
COMMUNITY
Start: 2021-02-25

## 2021-04-02 RX ORDER — EPINEPHRINE 0.3 MG/.3ML
INJECTION SUBCUTANEOUS
COMMUNITY
Start: 2021-01-18

## 2021-04-02 RX ORDER — METHYLPREDNISOLONE ACETATE 40 MG/ML
80 INJECTION, SUSPENSION INTRA-ARTICULAR; INTRALESIONAL; INTRAMUSCULAR; SOFT TISSUE
Status: COMPLETED | OUTPATIENT
Start: 2021-04-02 | End: 2021-04-02

## 2021-04-02 RX ORDER — DICYCLOMINE HYDROCHLORIDE 10 MG/1
CAPSULE ORAL
COMMUNITY
Start: 2021-03-12

## 2021-04-02 RX ORDER — LORATADINE 10 MG/1
TABLET ORAL
COMMUNITY

## 2021-04-02 RX ORDER — SENNOSIDES 8.6 MG
CAPSULE ORAL EVERY 8 HOURS
COMMUNITY

## 2021-04-02 RX ADMIN — METHYLPREDNISOLONE ACETATE 80 MG: 40 INJECTION, SUSPENSION INTRA-ARTICULAR; INTRALESIONAL; INTRAMUSCULAR; SOFT TISSUE at 11:25

## 2021-04-02 RX ADMIN — LIDOCAINE HYDROCHLORIDE 2 ML: 10 INJECTION, SOLUTION INFILTRATION; PERINEURAL at 11:05

## 2021-04-02 RX ADMIN — METHYLPREDNISOLONE ACETATE 80 MG: 40 INJECTION, SUSPENSION INTRA-ARTICULAR; INTRALESIONAL; INTRAMUSCULAR; SOFT TISSUE at 11:05

## 2021-04-02 RX ADMIN — LIDOCAINE HYDROCHLORIDE 2 ML: 10 INJECTION, SOLUTION INFILTRATION; PERINEURAL at 11:25

## 2021-04-02 NOTE — PROGRESS NOTES
ESTABLISHED PATIENT    Patient: Marcelino Elam  : 1940    Primary Care Provider: Giuliana Maxwell MD    Requesting Provider: As above    Follow-up (8 month follow up Arthritis of foot)      History    Chief Complaint: hindfoot pain, left    History of Present Illness: He is here for left subtalar injection, he has lost a lot of weight, he is doing therapy to try and regain strength, he reports his multiple myeloma treatment is going well    Current Outpatient Medications on File Prior to Visit   Medication Sig Dispense Refill   • acetaminophen (Tylenol 8 Hour) 650 MG 8 hr tablet Every 8 (Eight) Hours.     • ACETYLCARNITINE PO Take  by mouth.     • amLODIPine (NORVASC) 5 MG tablet Take 5 mg by mouth Daily.     • atenolol (TENORMIN) 25 MG tablet Take 25 mg by mouth Daily.     • B Complex Vitamins (VITAMIN B-COMPLEX 100 IJ) Vitamin B-Complex   Daily     • benazepril (LOTENSIN) 10 MG tablet benazepril 10 mg tablet   TAKE ONE TABLET BY MOUTH DAILY     • Calcium Carb-Cholecalciferol (Oyster Shell Calcium) 500-400 MG-UNIT tablet Oyster Shell Calcium-Vitamin D3 500 mg (1,250 mg)-400 unit tablet   Daily     • cephalexin (KEFLEX) 500 MG capsule Take 1 capsule by mouth Every 6 (Six) Hours. 60 capsule 0   • Cholecalciferol (VITAMIN D3) 1.25 MG (69045 UT) capsule Vitamin D3   Daily     • Coenzyme Q10 (COQ10 PO) Take  by mouth.     • dicyclomine (BENTYL) 10 MG capsule      • doxazosin (CARDURA) 4 MG tablet      • doxazosin (CARDURA) 8 MG tablet Take 8 mg by mouth Every Night.     • EPINEPHrine (EPIPEN) 0.3 MG/0.3ML solution auto-injector injection      • finasteride (PROSCAR) 5 MG tablet Take 5 mg by mouth Daily.     • Flaxseed, Linseed, (FLAX SEED OIL PO) Take 1,000 mg by mouth.     • fluticasone (FLONASE) 50 MCG/ACT nasal spray 2 sprays into each nostril Daily.     • ketoconazole (NIZORAL) 2 % shampoo ketoconazole 2 % shampoo   Daily     • lenalidomide (Revlimid) 5 MG capsule Take 5 mg by mouth 1 (One) Time.      • LEVOFLOXACIN IV Infuse  into a venous catheter.     • loratadine (Claritin) 10 MG tablet Claritin 10 mg tablet   Take 1 tablet every day by oral route.     • Nitrofurantoin Macrocrystal (MACRODANTIN PO) Take  by mouth Daily.     • Omega-3 Fatty Acids (FISH OIL) 1000 MG capsule capsule Fish Oil 300 mg-1,000 mg capsule,delayed release   Daily     • Omega-3 Fatty Acids (FISH OIL) 435 MG capsule Take  by mouth.     • oxybutynin XL (DITROPAN XL) 15 MG 24 hr tablet      • oxybutynin XL (DITROPAN-XL) 10 MG 24 hr tablet Take 10 mg by mouth Daily.     • rivaroxaban (XARELTO) 15 MG tablet Take 15 mg by mouth Daily.     • sildenafil (VIAGRA) 100 MG tablet sildenafil 100 mg tablet   TAKE ONE TABLET BY MOUTH DAILY     • simvastatin (ZOCOR) 20 MG tablet Take 20 mg by mouth Every Night.     • spironolactone (ALDACTONE) 25 MG tablet Take 25 mg by mouth Daily.     • tadalafil (CIALIS) 5 MG tablet tadalafil 5 mg tablet   Take 1 tablet daily PRN     • [DISCONTINUED] B Complex Vitamins (VITAMIN B COMPLEX PO) Take  by mouth.     • [DISCONTINUED] benazepril (LOTENSIN) 10 MG tablet Take 10 mg by mouth Daily.     • [DISCONTINUED] Calcium Carbonate-Vit D-Min (CALCIUM-VITAMIN D-MINERALS PO) Take  by mouth.     • [DISCONTINUED] Coenzyme Q10 10 MG capsule coenzyme Q10 100 mg capsule   Daily       No current facility-administered medications on file prior to visit.      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   • Radiotherapy    • Sleep apnea    • Stroke  "(CMS/[facility])      Past Surgical History:   Procedure Laterality Date   • OTHER SURGICAL HISTORY     • TONSILLECTOMY     • VASECTOMY       Family History   Problem Relation Age of Onset   • Hypertension Mother    • Heart disease Mother    • Cancer Father    • Hypertension Other    • Heart disease Other       Social History     Socioeconomic History   • Marital status:      Spouse name: Not on file   • Number of children: Not on file   • Years of education: Not on file   • Highest education level: Not on file   Tobacco Use   • Smoking status: Never Smoker   • Smokeless tobacco: Never Used   Substance and Sexual Activity   • Alcohol use: Yes     Comment: Occ   • Drug use: No   • Sexual activity: Defer        Review of Systems   Constitutional: Negative.    HENT: Negative.    Eyes: Negative.    Respiratory: Negative.    Cardiovascular: Negative.    Gastrointestinal: Negative.    Endocrine: Negative.    Genitourinary: Negative.    Musculoskeletal: Positive for arthralgias.   Skin: Negative.    Allergic/Immunologic: Negative.    Neurological: Negative.    Hematological: Negative.    Psychiatric/Behavioral: Negative.        The following portions of the patient's history were reviewed and updated as appropriate: allergies, current medications, past family history, past medical history, past social history, past surgical history and problem list.    Physical Exam:   /80   Ht 180.3 cm (71\")   Wt 84.2 kg (185 lb 9.6 oz)   BMI 25.89 kg/m²   Tender left subtalar joint and sinus Tarsi    Medical Decision Making    Data Review:   none    Assessment/Plan/Diagnosis/Treatment Options:   1. Chronic pain of left ankle  He is here for left subtalar injection, return 6 months    After discussing the risks (including infection, skin atrophy, etc), time out was called,  the left subtalar joint was prepped and using sterile technique injected with 2cc of 1% lidocaine and 2cc (80mg) DepoMedrol.  A band aid was applied.  No " complications.           l        Clover Wells MD

## 2021-04-02 NOTE — PROGRESS NOTES
Procedure   Medium Joint Arthrocentesis- left subtalar: L ankle  Date/Time: 4/2/2021 11:25 AM  Consent given by: patient  Site marked: site marked  Timeout: Immediately prior to procedure a time out was called to verify the correct patient, procedure, equipment, support staff and site/side marked as required   Supporting Documentation  Indications: pain   Procedure Details  Location: ankle - L ankle  Preparation: Patient was prepped and draped in the usual sterile fashion  Needle size: 23 G  Approach: anterolateral  Medications administered: 2 mL lidocaine 1 %; 80 mg methylPREDNISolone acetate 40 MG/ML  Patient tolerance: patient tolerated the procedure well with no immediate complications

## 2021-04-02 NOTE — PROGRESS NOTES
Procedure   Medium Joint Arthrocentesis: L ankle  Date/Time: 4/2/2021 11:05 AM  Consent given by: patient  Site marked: site marked  Timeout: Immediately prior to procedure a time out was called to verify the correct patient, procedure, equipment, support staff and site/side marked as required   Supporting Documentation  Indications: pain   Procedure Details  Location: ankle - L ankle  Preparation: Patient was prepped and draped in the usual sterile fashion  Needle size: 23 G  Approach: anteromedial  Medications administered: 2 mL lidocaine 1 %; 80 mg methylPREDNISolone acetate 40 MG/ML  Patient tolerance: patient tolerated the procedure well with no immediate complications

## 2021-11-08 ENCOUNTER — OFFICE VISIT (OUTPATIENT)
Dept: ORTHOPEDIC SURGERY | Facility: CLINIC | Age: 81
End: 2021-11-08

## 2021-11-08 VITALS
WEIGHT: 174 LBS | HEART RATE: 49 BPM | SYSTOLIC BLOOD PRESSURE: 129 MMHG | DIASTOLIC BLOOD PRESSURE: 54 MMHG | BODY MASS INDEX: 24.36 KG/M2 | HEIGHT: 71 IN

## 2021-11-08 DIAGNOSIS — M19.079 ARTHRITIS OF FOOT: Primary | ICD-10-CM

## 2021-11-08 PROCEDURE — 20605 DRAIN/INJ JOINT/BURSA W/O US: CPT | Performed by: ORTHOPAEDIC SURGERY

## 2021-11-08 RX ORDER — LUBIPROSTONE 24 UG/1
CAPSULE ORAL
COMMUNITY
Start: 2021-10-28

## 2021-11-08 RX ORDER — TRIAMCINOLONE ACETONIDE 40 MG/ML
2 INJECTION, SUSPENSION INTRA-ARTICULAR; INTRAMUSCULAR
Status: COMPLETED | OUTPATIENT
Start: 2021-11-08 | End: 2021-11-08

## 2021-11-08 RX ORDER — APIXABAN 2.5 MG/1
TABLET, FILM COATED ORAL
COMMUNITY
Start: 2021-10-16

## 2021-11-08 RX ORDER — LIDOCAINE HYDROCHLORIDE 10 MG/ML
2 INJECTION, SOLUTION EPIDURAL; INFILTRATION; INTRACAUDAL; PERINEURAL
Status: COMPLETED | OUTPATIENT
Start: 2021-11-08 | End: 2021-11-08

## 2021-11-08 RX ADMIN — TRIAMCINOLONE ACETONIDE 2 ML: 40 INJECTION, SUSPENSION INTRA-ARTICULAR; INTRAMUSCULAR at 16:02

## 2021-11-08 RX ADMIN — LIDOCAINE HYDROCHLORIDE 2 ML: 10 INJECTION, SOLUTION EPIDURAL; INFILTRATION; INTRACAUDAL; PERINEURAL at 16:02

## 2021-11-08 NOTE — PROGRESS NOTES
Procedure   Medium Joint Arthrocentesis  Date/Time: 11/8/2021 4:02 PM  Consent given by: patient  Site marked: site marked  Timeout: Immediately prior to procedure a time out was called to verify the correct patient, procedure, equipment, support staff and site/side marked as required   Supporting Documentation  Indications: pain   Procedure Details  Location: left subtalor.  Preparation: Patient was prepped and draped in the usual sterile fashion  Needle size: 22 G  Approach: dorsal  Medications administered: 2 mL triamcinolone acetonide 40 MG/ML; 2 mL lidocaine PF 1% 1 %  Patient tolerance: patient tolerated the procedure well with no immediate complications

## 2021-11-08 NOTE — PROGRESS NOTES
ESTABLISHED PATIENT    Patient: Marcelino Elam  : 1940    Primary Care Provider: Giuliana Maxwell MD    Requesting Provider: As above    Follow-up (7 month follow up Arthritis of foot, left (subtalor cortisone injection 21))      History    Chief Complaint: left hindfoot pain    History of Present Illness: he is here for left subtalar injection.  He reports his myeloma is responding to treatment    Current Outpatient Medications on File Prior to Visit   Medication Sig Dispense Refill   • acetaminophen (Tylenol 8 Hour) 650 MG 8 hr tablet Every 8 (Eight) Hours.     • ACETYLCARNITINE PO Take  by mouth.     • amLODIPine (NORVASC) 5 MG tablet Take 5 mg by mouth Daily.     • atenolol (TENORMIN) 25 MG tablet Take 25 mg by mouth Daily.     • B Complex Vitamins (VITAMIN B-COMPLEX 100 IJ) Vitamin B-Complex   Daily     • benazepril (LOTENSIN) 10 MG tablet benazepril 10 mg tablet   TAKE ONE TABLET BY MOUTH DAILY     • Calcium Carb-Cholecalciferol (Oyster Shell Calcium) 500-400 MG-UNIT tablet Oyster Shell Calcium-Vitamin D3 500 mg (1,250 mg)-400 unit tablet   Daily     • Cholecalciferol (VITAMIN D3) 1.25 MG (25138 UT) capsule Vitamin D3   Daily     • Coenzyme Q10 (COQ10 PO) Take  by mouth.     • dicyclomine (BENTYL) 10 MG capsule      • doxazosin (CARDURA) 4 MG tablet      • doxazosin (CARDURA) 8 MG tablet Take 8 mg by mouth Every Night.     • EPINEPHrine (EPIPEN) 0.3 MG/0.3ML solution auto-injector injection      • finasteride (PROSCAR) 5 MG tablet Take 5 mg by mouth Daily.     • Flaxseed, Linseed, (FLAX SEED OIL PO) Take 1,000 mg by mouth.     • fluticasone (FLONASE) 50 MCG/ACT nasal spray 2 sprays into each nostril Daily.     • ketoconazole (NIZORAL) 2 % shampoo ketoconazole 2 % shampoo   Daily     • lenalidomide (Revlimid) 5 MG capsule Take 5 mg by mouth 1 (One) Time.     • LEVOFLOXACIN IV Infuse  into a venous catheter.     • loratadine (Claritin) 10 MG tablet Claritin 10 mg tablet   Take 1 tablet every  day by oral route.     • Nitrofurantoin Macrocrystal (MACRODANTIN PO) Take  by mouth Daily.     • Omega-3 Fatty Acids (FISH OIL) 1000 MG capsule capsule Fish Oil 300 mg-1,000 mg capsule,delayed release   Daily     • Omega-3 Fatty Acids (FISH OIL) 435 MG capsule Take  by mouth.     • oxybutynin XL (DITROPAN XL) 15 MG 24 hr tablet      • oxybutynin XL (DITROPAN-XL) 10 MG 24 hr tablet Take 10 mg by mouth Daily.     • rivaroxaban (XARELTO) 15 MG tablet Take 15 mg by mouth Daily.     • sildenafil (VIAGRA) 100 MG tablet sildenafil 100 mg tablet   TAKE ONE TABLET BY MOUTH DAILY     • simvastatin (ZOCOR) 20 MG tablet Take 20 mg by mouth Every Night.     • spironolactone (ALDACTONE) 25 MG tablet Take 25 mg by mouth Daily.     • tadalafil (CIALIS) 5 MG tablet tadalafil 5 mg tablet   Take 1 tablet daily PRN     • Eliquis 2.5 MG tablet tablet      • lubiprostone (AMITIZA) 24 MCG capsule      • [DISCONTINUED] cephalexin (KEFLEX) 500 MG capsule Take 1 capsule by mouth Every 6 (Six) Hours. 60 capsule 0     No current facility-administered medications on file prior to visit.      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   • Radiotherapy    • Sleep apnea    • Stroke (HCC)      Past Surgical History:   Procedure Laterality Date   • OTHER SURGICAL HISTORY     • TONSILLECTOMY     • VASECTOMY       Family History   Problem Relation Age of Onset   • Hypertension Mother    • Heart disease Mother    • Cancer Father    • Hypertension Other    • Heart disease  "Other       Social History     Socioeconomic History   • Marital status:    Tobacco Use   • Smoking status: Never Smoker   • Smokeless tobacco: Never Used   Substance and Sexual Activity   • Alcohol use: Yes     Comment: Occ   • Drug use: No   • Sexual activity: Defer        Review of Systems   Constitutional: Negative.    HENT: Negative.    Eyes: Negative.    Respiratory: Negative.    Cardiovascular: Negative.    Gastrointestinal: Negative.    Endocrine: Negative.    Genitourinary: Negative.    Musculoskeletal: Positive for arthralgias.   Skin: Negative.    Allergic/Immunologic: Negative.    Neurological: Negative.    Hematological: Negative.    Psychiatric/Behavioral: Negative.        The following portions of the patient's history were reviewed and updated as appropriate: allergies, current medications, past family history, past medical history, past social history, past surgical history and problem list.    Physical Exam:   /54   Pulse (!) 49   Ht 180.3 cm (70.98\")   Wt 78.9 kg (174 lb)   BMI 24.28 kg/m²       Left foot- tender, stiff subtalar joint    Medical Decision Making    Data Review:   none    Assessment/Plan/Diagnosis/Treatment Options:   1. Arthritis of foot  He is here for injection, I will see him in 6 months    After discussing the risks (including infection, skin atrophy, etc), time out was called,  the left subtalar joint was prepped and using sterile technique injected with 2cc of 1% lidocaine and 2cc (80mg) triamcinolone l.  A band aid was applied.  No complications.                 Clover Wells MD                      "